# Patient Record
Sex: MALE | Race: WHITE | HISPANIC OR LATINO
[De-identification: names, ages, dates, MRNs, and addresses within clinical notes are randomized per-mention and may not be internally consistent; named-entity substitution may affect disease eponyms.]

---

## 2018-08-15 ENCOUNTER — APPOINTMENT (OUTPATIENT)
Dept: RADIOLOGY | Facility: CLINIC | Age: 33
End: 2018-08-15

## 2018-08-15 ENCOUNTER — OUTPATIENT (OUTPATIENT)
Dept: OUTPATIENT SERVICES | Facility: HOSPITAL | Age: 33
LOS: 1 days | End: 2018-08-15
Payer: COMMERCIAL

## 2018-08-15 DIAGNOSIS — Z00.8 ENCOUNTER FOR OTHER GENERAL EXAMINATION: ICD-10-CM

## 2018-08-15 PROCEDURE — 72110 X-RAY EXAM L-2 SPINE 4/>VWS: CPT | Mod: 26

## 2018-08-15 PROCEDURE — 72110 X-RAY EXAM L-2 SPINE 4/>VWS: CPT

## 2018-11-26 ENCOUNTER — EMERGENCY (EMERGENCY)
Facility: HOSPITAL | Age: 33
LOS: 1 days | Discharge: HOME | End: 2018-11-26
Attending: EMERGENCY MEDICINE
Payer: COMMERCIAL

## 2018-11-26 VITALS
DIASTOLIC BLOOD PRESSURE: 88 MMHG | OXYGEN SATURATION: 98 % | TEMPERATURE: 99 F | SYSTOLIC BLOOD PRESSURE: 139 MMHG | HEIGHT: 71 IN | RESPIRATION RATE: 18 BRPM | WEIGHT: 199.96 LBS | HEART RATE: 94 BPM

## 2018-11-26 PROCEDURE — 90715 TDAP VACCINE 7 YRS/> IM: CPT

## 2018-11-26 PROCEDURE — 73130 X-RAY EXAM OF HAND: CPT

## 2018-11-26 PROCEDURE — 99283 EMERGENCY DEPT VISIT LOW MDM: CPT | Mod: 25

## 2018-11-26 PROCEDURE — 73130 X-RAY EXAM OF HAND: CPT | Mod: 26,LT

## 2018-11-26 PROCEDURE — 90471 IMMUNIZATION ADMIN: CPT

## 2018-11-26 PROCEDURE — 99283 EMERGENCY DEPT VISIT LOW MDM: CPT

## 2018-11-26 RX ORDER — TETANUS TOXOID, REDUCED DIPHTHERIA TOXOID AND ACELLULAR PERTUSSIS VACCINE, ADSORBED 5; 2.5; 8; 8; 2.5 [IU]/.5ML; [IU]/.5ML; UG/.5ML; UG/.5ML; UG/.5ML
0.5 SUSPENSION INTRAMUSCULAR ONCE
Qty: 0 | Refills: 0 | Status: COMPLETED | OUTPATIENT
Start: 2018-11-26 | End: 2018-11-26

## 2018-11-26 RX ADMIN — Medication 450 MILLIGRAM(S): at 14:14

## 2018-11-26 RX ADMIN — TETANUS TOXOID, REDUCED DIPHTHERIA TOXOID AND ACELLULAR PERTUSSIS VACCINE, ADSORBED 0.5 MILLILITER(S): 5; 2.5; 8; 8; 2.5 SUSPENSION INTRAMUSCULAR at 14:14
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
## 2018-11-26 NOTE — ED ADULT NURSE NOTE - CHPI ED NUR SYMPTOMS NEG
no purulent drainage/no rectal pain/no bleeding at site/no drainage/no vomiting/no blood in mucus/no chills
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
## 2018-11-26 NOTE — ED PROVIDER NOTE - PROGRESS NOTE DETAILS
PA note: Pt educated to call and make appointment with MD Caraballo (hand Sx) upon d/c for a follow up appointment this Thursday.  MD Bueno confirmed appointment availability with MD Caraballo
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
## 2018-11-26 NOTE — ED ADULT NURSE NOTE - NSIMPLEMENTINTERV_GEN_ALL_ED
Implemented All Universal Safety Interventions:  [place] to call system. Call bell, personal items and telephone within reach. Instruct patient to call for assistance. Room bathroom lighting operational. Non-slip footwear when patient is off stretcher. Physically safe environment: no spills, clutter or unnecessary equipment. Stretcher in lowest position, wheels locked, appropriate side rails in place.
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
## 2018-11-26 NOTE — ED PROVIDER NOTE - PHYSICAL EXAMINATION
MSK: TTP dorsum of L hand and to base of 2 digit. Full extension and flexion of L 2nd digit.   Skin: Cellulitis of dorsum of L hand starting at base of 2 L digit, does not extend past wrist.  Ecchymosis to palmar aspect of L hand.  Vascular: Cap refill <2secs. +2 radial / ulnar pulses b/l.

## 2019-12-13 ENCOUNTER — EMERGENCY (EMERGENCY)
Facility: HOSPITAL | Age: 34
LOS: 1 days | Discharge: HOME | End: 2019-12-13
Attending: EMERGENCY MEDICINE
Payer: COMMERCIAL

## 2019-12-13 VITALS
SYSTOLIC BLOOD PRESSURE: 119 MMHG | WEIGHT: 197.98 LBS | HEIGHT: 71 IN | HEART RATE: 86 BPM | TEMPERATURE: 98 F | OXYGEN SATURATION: 96 % | RESPIRATION RATE: 20 BRPM | DIASTOLIC BLOOD PRESSURE: 71 MMHG

## 2019-12-13 LAB
ALBUMIN SERPL ELPH-MCNC: 4.5 G/DL — SIGNIFICANT CHANGE UP (ref 3.3–5.2)
ALP SERPL-CCNC: 116 U/L — SIGNIFICANT CHANGE UP (ref 40–120)
ALT FLD-CCNC: 30 U/L — SIGNIFICANT CHANGE UP
ANION GAP SERPL CALC-SCNC: 12 MMOL/L — SIGNIFICANT CHANGE UP (ref 5–17)
AST SERPL-CCNC: 21 U/L — SIGNIFICANT CHANGE UP
BASOPHILS # BLD AUTO: 0.04 K/UL — SIGNIFICANT CHANGE UP (ref 0–0.2)
BASOPHILS NFR BLD AUTO: 0.3 % — SIGNIFICANT CHANGE UP (ref 0–2)
BILIRUB SERPL-MCNC: 0.5 MG/DL — SIGNIFICANT CHANGE UP (ref 0.4–2)
BUN SERPL-MCNC: 13 MG/DL — SIGNIFICANT CHANGE UP (ref 8–20)
CALCIUM SERPL-MCNC: 9.8 MG/DL — SIGNIFICANT CHANGE UP (ref 8.6–10.2)
CHLORIDE SERPL-SCNC: 102 MMOL/L — SIGNIFICANT CHANGE UP (ref 98–107)
CO2 SERPL-SCNC: 27 MMOL/L — SIGNIFICANT CHANGE UP (ref 22–29)
CREAT SERPL-MCNC: 0.77 MG/DL — SIGNIFICANT CHANGE UP (ref 0.5–1.3)
D DIMER BLD IA.RAPID-MCNC: <150 NG/ML DDU — SIGNIFICANT CHANGE UP
EOSINOPHIL # BLD AUTO: 0.25 K/UL — SIGNIFICANT CHANGE UP (ref 0–0.5)
EOSINOPHIL NFR BLD AUTO: 2 % — SIGNIFICANT CHANGE UP (ref 0–6)
GLUCOSE SERPL-MCNC: 90 MG/DL — SIGNIFICANT CHANGE UP (ref 70–115)
HCT VFR BLD CALC: 50.9 % — HIGH (ref 39–50)
HGB BLD-MCNC: 17.2 G/DL — HIGH (ref 13–17)
IMM GRANULOCYTES NFR BLD AUTO: 0.5 % — SIGNIFICANT CHANGE UP (ref 0–1.5)
LYMPHOCYTES # BLD AUTO: 2.77 K/UL — SIGNIFICANT CHANGE UP (ref 1–3.3)
LYMPHOCYTES # BLD AUTO: 22.6 % — SIGNIFICANT CHANGE UP (ref 13–44)
MCHC RBC-ENTMCNC: 30.6 PG — SIGNIFICANT CHANGE UP (ref 27–34)
MCHC RBC-ENTMCNC: 33.8 GM/DL — SIGNIFICANT CHANGE UP (ref 32–36)
MCV RBC AUTO: 90.4 FL — SIGNIFICANT CHANGE UP (ref 80–100)
MONOCYTES # BLD AUTO: 0.98 K/UL — HIGH (ref 0–0.9)
MONOCYTES NFR BLD AUTO: 8 % — SIGNIFICANT CHANGE UP (ref 2–14)
NEUTROPHILS # BLD AUTO: 8.16 K/UL — HIGH (ref 1.8–7.4)
NEUTROPHILS NFR BLD AUTO: 66.6 % — SIGNIFICANT CHANGE UP (ref 43–77)
PLATELET # BLD AUTO: 195 K/UL — SIGNIFICANT CHANGE UP (ref 150–400)
POTASSIUM SERPL-MCNC: 5.1 MMOL/L — SIGNIFICANT CHANGE UP (ref 3.5–5.3)
POTASSIUM SERPL-SCNC: 5.1 MMOL/L — SIGNIFICANT CHANGE UP (ref 3.5–5.3)
PROT SERPL-MCNC: 7.5 G/DL — SIGNIFICANT CHANGE UP (ref 6.6–8.7)
RBC # BLD: 5.63 M/UL — SIGNIFICANT CHANGE UP (ref 4.2–5.8)
RBC # FLD: 11.8 % — SIGNIFICANT CHANGE UP (ref 10.3–14.5)
SODIUM SERPL-SCNC: 141 MMOL/L — SIGNIFICANT CHANGE UP (ref 135–145)
TROPONIN T SERPL-MCNC: <0.01 NG/ML — SIGNIFICANT CHANGE UP (ref 0–0.06)
WBC # BLD: 12.26 K/UL — HIGH (ref 3.8–10.5)
WBC # FLD AUTO: 12.26 K/UL — HIGH (ref 3.8–10.5)

## 2019-12-13 PROCEDURE — 36415 COLL VENOUS BLD VENIPUNCTURE: CPT

## 2019-12-13 PROCEDURE — 85027 COMPLETE CBC AUTOMATED: CPT

## 2019-12-13 PROCEDURE — 80053 COMPREHEN METABOLIC PANEL: CPT

## 2019-12-13 PROCEDURE — 85379 FIBRIN DEGRADATION QUANT: CPT

## 2019-12-13 PROCEDURE — 93005 ELECTROCARDIOGRAM TRACING: CPT

## 2019-12-13 PROCEDURE — 71046 X-RAY EXAM CHEST 2 VIEWS: CPT | Mod: 26

## 2019-12-13 PROCEDURE — 71046 X-RAY EXAM CHEST 2 VIEWS: CPT

## 2019-12-13 PROCEDURE — 93010 ELECTROCARDIOGRAM REPORT: CPT

## 2019-12-13 PROCEDURE — 99284 EMERGENCY DEPT VISIT MOD MDM: CPT | Mod: 25

## 2019-12-13 PROCEDURE — 84484 ASSAY OF TROPONIN QUANT: CPT

## 2019-12-13 PROCEDURE — 99284 EMERGENCY DEPT VISIT MOD MDM: CPT

## 2019-12-13 RX ORDER — LIDOCAINE 4 G/100G
1 CREAM TOPICAL ONCE
Refills: 0 | Status: COMPLETED | OUTPATIENT
Start: 2019-12-13 | End: 2019-12-13

## 2019-12-13 RX ADMIN — LIDOCAINE 1 PATCH: 4 CREAM TOPICAL at 08:54

## 2019-12-13 NOTE — ED ADULT NURSE NOTE - OBJECTIVE STATEMENT
Pt in no apparent distress at this time. Airway patent, breathing spontaneous and nonlabored, pules palpable and present. Pt A&OX3, resting in stretcher. Pt c/o right sided chest pain during inspiration. Pt states pain started 3 days ago. Denies any SOB, dizziness or difficulty breathing. Pt current smoker. Pt states he has a hx of TBI, sometimes has difficulty swallowing and unsure if he swallowed something incorrectly. Pt has no other complaints. NSR on cardiac monitor.

## 2019-12-13 NOTE — ED PROVIDER NOTE - OBJECTIVE STATEMENT
Patient is a 34 year old male with history of TBI and neuro stimulator to help him swallow presenting with right lower chest pain. Symptoms present for the pats several days, worse with breathing. N+ cough. no fevers, chills, sweats, trauma, rashes. Tried advil with some relief. No LE pain or swelling. No travel or surgery. never had this before. No abd pain n/v/d. No radiation of pain.

## 2019-12-13 NOTE — ED ADULT TRIAGE NOTE - CHIEF COMPLAINT QUOTE
Chest pain with deep inspiration on right side x2-3 days.  smoker.  No difficulty breathing, it's hard to sleep when laying down. well appearing, no distress. denies cardiac history. denies fever. hx of TBI with placement of electrodes on neck to help with swallowing, sometimes aspirates with thin liquids.

## 2019-12-13 NOTE — ED PROVIDER NOTE - PROGRESS NOTE DETAILS
AJM: hallie naik. workup neg. stable for dc All results discussed with the patient, and a copy of results has been provided. Patient is comfortable with dc plan for home. Opportunity for questions was provided and all questions have been adressed.

## 2019-12-13 NOTE — ED PROVIDER NOTE - PATIENT PORTAL LINK FT
You can access the FollowMyHealth Patient Portal offered by [facility] by registering at the following website: [email or web address]. By joining [employer]’s FollowMyHealth portal, you will also be able to view your health information using other applications (apps) compatible with our system.

## 2019-12-13 NOTE — ED PROVIDER NOTE - CLINICAL SUMMARY MEDICAL DECISION MAKING FREE TEXT BOX
pt with right sided chest pain. low risk for acs or PE. No rashes or trauma. dimer, ecg, trop, cxr, pain meds, reassess. dc if neg

## 2022-12-03 ENCOUNTER — EMERGENCY (EMERGENCY)
Facility: HOSPITAL | Age: 37
LOS: 1 days | Discharge: HOME | End: 2022-12-03
Attending: EMERGENCY MEDICINE
Payer: COMMERCIAL

## 2022-12-03 VITALS
HEIGHT: 71 IN | DIASTOLIC BLOOD PRESSURE: 78 MMHG | SYSTOLIC BLOOD PRESSURE: 138 MMHG | WEIGHT: 205.03 LBS | RESPIRATION RATE: 15 BRPM | TEMPERATURE: 98 F | HEART RATE: 106 BPM | OXYGEN SATURATION: 99 %

## 2022-12-03 VITALS
RESPIRATION RATE: 16 BRPM | OXYGEN SATURATION: 100 % | DIASTOLIC BLOOD PRESSURE: 72 MMHG | SYSTOLIC BLOOD PRESSURE: 126 MMHG | TEMPERATURE: 98 F | HEART RATE: 86 BPM

## 2022-12-03 PROBLEM — S06.9X9A UNSPECIFIED INTRACRANIAL INJURY WITH LOSS OF CONSCIOUSNESS OF UNSPECIFIED DURATION, INITIAL ENCOUNTER: Chronic | Status: ACTIVE | Noted: 2019-12-13

## 2022-12-03 PROCEDURE — 99284 EMERGENCY DEPT VISIT MOD MDM: CPT | Mod: 25

## 2022-12-03 PROCEDURE — 99284 EMERGENCY DEPT VISIT MOD MDM: CPT

## 2022-12-03 PROCEDURE — 73090 X-RAY EXAM OF FOREARM: CPT

## 2022-12-03 PROCEDURE — 29125 APPL SHORT ARM SPLINT STATIC: CPT

## 2022-12-03 PROCEDURE — 73090 X-RAY EXAM OF FOREARM: CPT | Mod: 26,LT

## 2022-12-03 PROCEDURE — 73110 X-RAY EXAM OF WRIST: CPT | Mod: 26,LT

## 2022-12-03 PROCEDURE — 96372 THER/PROPH/DIAG INJ SC/IM: CPT | Mod: XU

## 2022-12-03 PROCEDURE — 73110 X-RAY EXAM OF WRIST: CPT

## 2022-12-03 PROCEDURE — 29125 APPL SHORT ARM SPLINT STATIC: CPT | Mod: LT

## 2022-12-03 RX ORDER — KETOROLAC TROMETHAMINE 30 MG/ML
30 SYRINGE (ML) INJECTION ONCE
Refills: 0 | Status: DISCONTINUED | OUTPATIENT
Start: 2022-12-03 | End: 2022-12-03

## 2022-12-03 RX ORDER — IBUPROFEN 200 MG
1 TABLET ORAL
Qty: 20 | Refills: 0
Start: 2022-12-03 | End: 2022-12-07

## 2022-12-03 RX ADMIN — Medication 30 MILLIGRAM(S): at 12:55

## 2022-12-03 NOTE — ED PROVIDER NOTE - OBJECTIVE STATEMENT
38 y/o M with no significant PMHx presenting with c/o left forearm pain today s/p trip/fall while going down the stairs (missed 2 steps), he was feeding his dogs at that time. Denies any LOC and denies any trauma/injury to head/neck. No anticoagulants use. Denies numbness over fingers or extremities, back pain, abdominal pain, dizziness, headaches, n/v, fever/chills, sob, cp, neck pain and with no other other c/o. Of note, pt is right handed.

## 2022-12-03 NOTE — ED PROVIDER NOTE - CARE PLAN
1 Principal Discharge DX:	Pain and swelling of forearm, left  Secondary Diagnosis:	Fall at home   Principal Discharge DX:	Closed fracture of forearm  Secondary Diagnosis:	Fall at home

## 2022-12-03 NOTE — CONSULT NOTE ADULT - SUBJECTIVE AND OBJECTIVE BOX
Pt Name: LISA NICE    MRN: [id number]      Patient is a 37y Male PMHx TBI presenting to the emergency department with a chief complaint Left arm pain s/p fall. Patient reports falling onto left forearm from 2-3 step height. Found to have Left Ulna midshaft fracture. Patient splinted by ED.  Patient denies head injury, LOC, Other orthopedic complaints. Denies numbness, tingling. No acute motor sensory changes.      REVIEW OF SYSTEMS    General: Alert, responsive, in NAD    Respiratory and Thorax: No difficulty breathing. No cough.  	   Cardiovascular:	No chest pain. No palpitations.    Musculoskeletal: SEE HPI.    Neurological: No sensory or motor changes.     Endocrine: No Hx of diabetes.    ROS is otherwise negative.      PAST MEDICAL & SURGICAL HISTORY:  PAST MEDICAL & SURGICAL HISTORY:  TBI (traumatic brain injury)      No significant past surgical history        Allergies: No Known Allergies      Medications:     FAMILY HISTORY:  No pertinent family history in first degree relatives  : non-contribtory    Ambulation: Walking independently [X ] With Cane [ ] With Walker [ ]  Bedbound [ ]         Vital Signs Last 24 Hrs  T(C): 36.9 (03 Dec 2022 15:18), Max: 36.9 (03 Dec 2022 11:30)  T(F): 98.4 (03 Dec 2022 15:18), Max: 98.5 (03 Dec 2022 11:30)  HR: 86 (03 Dec 2022 15:18) (86 - 106)  BP: 126/72 (03 Dec 2022 15:18) (126/72 - 138/78)  BP(mean): --  RR: 16 (03 Dec 2022 15:18) (15 - 16)  SpO2: 100% (03 Dec 2022 15:18) (99% - 100%)    Parameters below as of 03 Dec 2022 15:18  Patient On (Oxygen Delivery Method): room air        Daily Height in cm: 180.34 (03 Dec 2022 11:30)    Daily       PHYSICAL EXAM:      Appearance: Alert, responsive, in no acute distress.    Neurological: Sensation is grossly intact to light touch. Ulnar median radial nerve distribution intact. No focal deficits or weaknesses found.    Skin: Skin is clean, dry and intact. No bleeding. No abrasions. No ulcerations.    Vascular: Cap refill < 2 sec. No signs of venous insufficiency or stasis. No extremity ulcerations. No cyanosis.    Musculoskeletal:         Left Upper Extremity: Splint in place. Sling in place. Active ROM of digits, shoulder abduction adduction without pain elicited. Clavicle nontender to palpation. No shoulder bony tenderness. Compartments soft, compressible, nontender. Denies Numbness, tingling, other orthopedic complaints.      Imaging Studies:  Prelim read Left Ulnar midshaft comminuted fracture    A/P:  Pt is a  37y Male found to have Left ulnar fracture    PLAN:   * Case and imaging D/w    * Pain control  * Maintain splint, keep dry  * Elevation for swelling  * Patient instructed to seek medical attention if numbness/tingling develop  * Follow-up in 1 week with Dr. Man outpatient

## 2022-12-03 NOTE — ED PROVIDER NOTE - CARE PROVIDER_API CALL
Maurice Bonilla  Orthopaedic Surgery  [address] [place] [address]  Phone: [phone]  Fax: [phone]  Follow Up Time: 1-3 Days

## 2022-12-03 NOTE — ED ADULT NURSE NOTE - OBJECTIVE STATEMENT
Patient is alert and oriented X4 from home. Patient states that he tripped while feeding the dogs this morning. Patient has pain in the left arm.

## 2022-12-03 NOTE — ED PROVIDER NOTE - CLINICAL SUMMARY MEDICAL DECISION MAKING FREE TEXT BOX
36 y/o M with no significant PMHx presenting with c/o left forearm pain today s/p trip/fall while going down the stairs (missed 2 steps), he was feeding his dogs at that time. Will rule out traumatic injury/fracture. Plan for left forearm/wrist xrays, pain meds, and reassessment. 36 y/o M with no significant PMHx presenting with c/o left forearm pain today s/p trip/fall while going down the stairs (missed 2 steps), he was feeding his dogs at that time. Will rule out traumatic injury/fracture. Xray wet read showed closed fracture of left forearm. Splint/sling placed over left arm/forearm. Ortho consulted over phone, who reviewed the imaging, pt can be discharged with outpatient f/u with Brie Garcia in 1 week.

## 2022-12-03 NOTE — ED PROVIDER NOTE - NSFOLLOWUPINSTRUCTIONS_ED_ALL_ED_FT
Please take ibuprofen/motrin 600mg every 6 hours as needed for pain with food  1) Please follow up with ortho clinic within 2-3 days  2) Please follow up with your PCP within 2-3 days  3) Return to the emergency room if you are experiencing any new or worsening symptoms    SEEK IMMEDIATE MEDICAL CARE IF YOU HAVE ANY OF THE FOLLOWING SYMPTOMS: numbness, tingling, increasing pain, or weakness in any part of the injured limb. Please take ibuprofen/motrin 600mg every 6 hours as needed for pain with food  1) Please follow up with ortho clinic within 2-3 days  2) Please follow up with your PCP within 2-3 days  3) Return to the emergency room if you are experiencing any new or worsening symptoms    Fracture    A fracture is a break in one of your bones. This can occur from a variety of injuries, especially traumatic ones. Symptoms include pain, bruising, or swelling. Do not use the injured limb. If a fracture is in one of the bones below your waist, do not put weight on that limb unless instructed to do so by your healthcare provider. Crutches or a cane may have been provided. A splint or cast may have been applied by your health care provider. Make sure to keep it dry and follow up with an orthopedist as instructed.    SEEK IMMEDIATE MEDICAL CARE IF YOU HAVE ANY OF THE FOLLOWING SYMPTOMS: numbness, tingling, increasing pain, or weakness in any part of the injured limb.

## 2022-12-03 NOTE — ED PROVIDER NOTE - PHYSICAL EXAMINATION
Constitutional: Awake, alert and oriented. In no acute distress. Well appearing.  HEENT: NC/AT. Moist mucous membranes.   Eyes: No scleral icterus. EOMI.  Neck:. Soft and supple. Full ROM without pain. No step offs deformities  Cardiac: Regular rate and regular rhythm. +S1/S2. Peripheral pulses 2+ and symmetric. No LE edema.  Respiratory: Speaking in full sentences. No evidence of respiratory distress. No wheezes, rales or rhonchi.  Abdomen: Soft, non-distended and non tender Normal bowel sounds in all 4 quadrants. No guarding or rebound. no CVAT  Back: Spine midline and non-tender. No step offs deformities  Skin: No rashes, abrasions or lacerations.  Lymph: No cervical lymphadenopathy.  MSK: (+) tenderness/swelling over dorsal aspect left forearm. Sensation intact and radial pulses 2+ LUE.  FROM left shoulder.   Neuro: Awake, alert & oriented x 3. Moves all extremities symmetrically. Negative pronator drift, strength 5/5 all upper and lower extremities, sensation to light touch intact throughout upper/ lower extremities and face, finger to nose coordination intact, cranial nerves 2-12 intact  Psych: calm, cooperative, normal affect

## 2022-12-03 NOTE — ED PROVIDER NOTE - PATIENT PORTAL LINK FT
You can access the FollowMyHealth Patient Portal offered by [facility] by registering at the following website: [email or web address]. By joining [employer]’s FollowMyHealth portal, you will also be able to view your health information using other applications (apps) compatible with our system.

## 2022-12-03 NOTE — CONSULT NOTE ADULT - NS ATTEND AMEND GEN_ALL_CORE FT
Patient s/p closed treatment of ulna shaft fracture with splint immobilization and will follow up as outpatient for casting and repeat xrays.